# Patient Record
Sex: FEMALE | Race: WHITE | NOT HISPANIC OR LATINO | Employment: PART TIME | URBAN - METROPOLITAN AREA
[De-identification: names, ages, dates, MRNs, and addresses within clinical notes are randomized per-mention and may not be internally consistent; named-entity substitution may affect disease eponyms.]

---

## 2018-02-12 ENCOUNTER — OFFICE VISIT (OUTPATIENT)
Dept: URGENT CARE | Facility: CLINIC | Age: 48
End: 2018-02-12
Payer: COMMERCIAL

## 2018-02-12 ENCOUNTER — APPOINTMENT (OUTPATIENT)
Dept: RADIOLOGY | Facility: CLINIC | Age: 48
End: 2018-02-12
Payer: COMMERCIAL

## 2018-02-12 VITALS
HEIGHT: 64 IN | SYSTOLIC BLOOD PRESSURE: 114 MMHG | RESPIRATION RATE: 20 BRPM | OXYGEN SATURATION: 99 % | BODY MASS INDEX: 24.34 KG/M2 | WEIGHT: 142.6 LBS | DIASTOLIC BLOOD PRESSURE: 103 MMHG | TEMPERATURE: 98.8 F | HEART RATE: 114 BPM

## 2018-02-12 DIAGNOSIS — J06.9 UPPER RESPIRATORY TRACT INFECTION, UNSPECIFIED TYPE: ICD-10-CM

## 2018-02-12 DIAGNOSIS — R05.9 COUGH: ICD-10-CM

## 2018-02-12 DIAGNOSIS — J06.9 UPPER RESPIRATORY TRACT INFECTION, UNSPECIFIED TYPE: Primary | ICD-10-CM

## 2018-02-12 PROCEDURE — 71046 X-RAY EXAM CHEST 2 VIEWS: CPT

## 2018-02-12 PROCEDURE — 99203 OFFICE O/P NEW LOW 30 MIN: CPT | Performed by: PHYSICIAN ASSISTANT

## 2018-02-12 RX ORDER — DOXYCYCLINE 100 MG/1
100 TABLET ORAL 2 TIMES DAILY
Qty: 20 TABLET | Refills: 0 | Status: SHIPPED | OUTPATIENT
Start: 2018-02-12 | End: 2018-02-22

## 2018-02-12 RX ORDER — BUPROPION HYDROCHLORIDE 150 MG/1
150 TABLET ORAL DAILY
COMMUNITY

## 2018-02-12 NOTE — PROGRESS NOTES
Assessment/Plan:      Diagnoses and all orders for this visit:    Upper respiratory tract infection, unspecified type  -     X-ray chest 2 views; Future  -     doxycycline (ADOXA) 100 MG tablet; Take 1 tablet (100 mg total) by mouth 2 (two) times a day for 10 days    Cough    Other orders  -     buPROPion (WELLBUTRIN XL) 150 mg 24 hr tablet; Take 150 mg by mouth daily        Patient Instructions   Take antibiotic as prescribed  otc meds as needed for sxs control  Follow up with family dr in 4 days      Subjective:    Chief Complaint   Patient presents with    Cough      Patient ID: Maddy Shepherd is a 41 y o  female  Cough   This is a new problem  The current episode started yesterday  The problem has been unchanged  The problem occurs every few minutes  The cough is non-productive  Associated symptoms include ear pain, myalgias, nasal congestion, postnasal drip, rhinorrhea and a sore throat  Pertinent negatives include no chest pain, chills, ear congestion, fever, headaches, heartburn, hemoptysis, rash, shortness of breath, sweats, weight loss or wheezing  Nothing aggravates the symptoms  She has tried OTC cough suppressant for the symptoms  The treatment provided mild relief  There is no history of asthma, bronchitis, COPD or emphysema  Review of Systems   Constitutional: Negative for chills, fever and weight loss  HENT: Positive for ear pain, postnasal drip, rhinorrhea and sore throat  Respiratory: Positive for cough  Negative for hemoptysis, shortness of breath and wheezing  Cardiovascular: Negative for chest pain  Gastrointestinal: Negative for heartburn  Musculoskeletal: Positive for myalgias  Skin: Negative for rash  Neurological: Negative for headaches           Objective:    BP (!) 114/103 (BP Location: Right arm, Patient Position: Sitting, Cuff Size: Large)   Pulse (!) 114   Temp 98 8 °F (37 1 °C) (Tympanic)   Resp 20   Ht 5' 4" (1 626 m)   Wt 64 7 kg (142 lb 9 6 oz)   SpO2 99%   BMI 24 48 kg/m²      Physical Exam   Constitutional: She appears well-developed and well-nourished  HENT:   Head: Normocephalic and atraumatic  Right Ear: External ear normal    Left Ear: External ear normal    Nose: Mucosal edema and rhinorrhea (clear) present  Mouth/Throat: Posterior oropharyngeal erythema present  Cardiovascular: Regular rhythm, normal heart sounds and normal pulses  No extrasystoles are present  Tachycardia present  PMI is not displaced  Pulmonary/Chest: Effort normal and breath sounds normal    Lymphadenopathy:     She has cervical adenopathy

## 2018-12-03 ENCOUNTER — OFFICE VISIT (OUTPATIENT)
Dept: URGENT CARE | Facility: CLINIC | Age: 48
End: 2018-12-03
Payer: COMMERCIAL

## 2018-12-03 VITALS
RESPIRATION RATE: 18 BRPM | WEIGHT: 143 LBS | DIASTOLIC BLOOD PRESSURE: 91 MMHG | OXYGEN SATURATION: 99 % | HEART RATE: 118 BPM | BODY MASS INDEX: 24.55 KG/M2 | TEMPERATURE: 98.1 F | SYSTOLIC BLOOD PRESSURE: 130 MMHG

## 2018-12-03 DIAGNOSIS — J02.0 STREP PHARYNGITIS: Primary | ICD-10-CM

## 2018-12-03 LAB — S PYO AG THROAT QL: POSITIVE

## 2018-12-03 PROCEDURE — 99213 OFFICE O/P EST LOW 20 MIN: CPT | Performed by: PHYSICIAN ASSISTANT

## 2018-12-03 PROCEDURE — 87430 STREP A AG IA: CPT | Performed by: PHYSICIAN ASSISTANT

## 2018-12-03 RX ORDER — MULTIVITAMIN
1 CAPSULE ORAL DAILY
COMMUNITY

## 2018-12-03 RX ORDER — AMOXICILLIN 500 MG/1
1000 CAPSULE ORAL EVERY 24 HOURS
Qty: 20 CAPSULE | Refills: 0 | Status: SHIPPED | OUTPATIENT
Start: 2018-12-03 | End: 2018-12-13

## 2018-12-03 NOTE — PROGRESS NOTES
[facility]        NAME: Javier Colindres is a 57 y o  female  : 1970    MRN: [id number]  DATE: December 3, 2018  TIME: 11:19 AM    Assessment and Plan   Strep pharyngitis [J02 0]  1  Strep pharyngitis  POCT rapid strepA    amoxicillin (AMOXIL) 500 mg capsule         Patient Instructions     Take antibiotic as directed until completed  Tylenol as needed for fevers aches and pains  Stay well hydrated  Follow up with PCP in 3-5 days  Proceed to  ER if symptoms worsen  Chief Complaint     Chief Complaint   Patient presents with    Earache     Bilateral earaches, onset 3 days ago  Sore throat over the weekend that has subsided         History of Present Illness       17-year-old female presents with sore throat and ear pain  Symptoms started 3 days ago  Has been feeling fevers and chills  Stomach has felt a little upset nauseous  Denies any vomiting diarrhea chest pain  Sore Throat    This is a new problem  The current episode started in the past 7 days  The problem has been waxing and waning  Neither side of throat is experiencing more pain than the other  Maximum temperature: Subjective  The fever has been present for 1 to 2 days  The pain is moderate  Associated symptoms include ear pain  Pertinent negatives include no abdominal pain, congestion, coughing, ear discharge, headaches, shortness of breath, swollen glands or trouble swallowing  She has tried acetaminophen for the symptoms  The treatment provided mild relief  Review of Systems   Review of Systems   Constitutional: Positive for chills and fever  HENT: Positive for ear pain and sore throat  Negative for congestion, ear discharge and trouble swallowing  Respiratory: Negative for cough and shortness of breath  Gastrointestinal: Negative for abdominal pain  Neurological: Negative for headaches           Current Medications       Current Outpatient Prescriptions:     buPROPion (WELLBUTRIN XL) 150 mg 24 hr tablet, Take 150 mg by mouth daily, Disp: , Rfl:     Multiple Vitamin (MULTIVITAMIN) capsule, Take 1 capsule by mouth daily, Disp: , Rfl:     Probiotic Product (PROBIOTIC-10 PO), Take by mouth, Disp: , Rfl:     amoxicillin (AMOXIL) 500 mg capsule, Take 2 capsules (1,000 mg total) by mouth every 24 hours for 10 days, Disp: 20 capsule, Rfl: 0    Current Allergies     Allergies as of 12/03/2018 - Reviewed 12/03/2018   Allergen Reaction Noted    Advil [ibuprofen] Diarrhea 02/12/2018    Azithromycin Diarrhea and Rash 02/12/2018            The following portions of the patient's history were reviewed and updated as appropriate: allergies, current medications, past family history, past medical history, past social history, past surgical history and problem list      History reviewed  No pertinent past medical history  Past Surgical History:   Procedure Laterality Date    ENDOMETRIAL ABLATION         No family history on file  Medications have been verified  Objective   /91   Pulse (!) 118   Temp 98 1 °F (36 7 °C) (Tympanic)   Resp 18   Wt 64 9 kg (143 lb)   SpO2 99%   BMI 24 55 kg/m²        Physical Exam     Physical Exam   Constitutional: She is oriented to person, place, and time  She appears well-developed and well-nourished  No distress  HENT:   Head: Normocephalic and atraumatic  Right Ear: External ear normal    Left Ear: External ear normal    Nose: Nose normal    Mouth/Throat: Posterior oropharyngeal erythema (Moderate erythema noted) present  No oropharyngeal exudate  Eyes: Conjunctivae are normal  Right eye exhibits no discharge  Left eye exhibits no discharge  Neck: Normal range of motion  Neck supple  Cardiovascular: Normal rate, regular rhythm, normal heart sounds and intact distal pulses  No murmur heard  Pulmonary/Chest: Effort normal and breath sounds normal  No respiratory distress  She has no wheezes  She has no rales  Abdominal: Soft   Bowel sounds are normal  There is no tenderness  Musculoskeletal: Normal range of motion  Lymphadenopathy:     She has no cervical adenopathy  Neurological: She is alert and oriented to person, place, and time  Skin: Skin is warm and dry  Psychiatric: She has a normal mood and affect  Nursing note and vitals reviewed

## 2018-12-03 NOTE — LETTER
December 3, 2018     Patient: Nazario Hassan   YOB: 1970   Date of Visit: 12/3/2018       To Whom it May Concern:    Nazario Hassan was seen in my clinic on 12/3/2018  She may return to work on 12/05/2018  If you have any questions or concerns, please don't hesitate to call  Sincerely,          Jodi Sim PA-C        CC: No Recipients

## 2018-12-03 NOTE — PATIENT INSTRUCTIONS
Take antibiotic as directed until completed  Tylenol as needed for fevers aches and pains  Stay well hydrated  Follow up with PCP in 3-5 days  Proceed to  ER if symptoms worsen  Strep Throat   AMBULATORY CARE:   Strep throat  is a throat infection caused by bacteria  It is easily spread from person to person  Common symptoms include the following:   · Sore, red, and swollen throat    · Fever and headache     · Upset stomach, abdominal pain, or vomiting    · White or yellow patches or blisters in the back of your throat    · Tender, swollen lumps on the sides of your neck or jaw    · Throat pain when you swallow  Call 911 for any of the following:   · You have trouble breathing  Seek care immediately if:   · You have new symptoms like a bad headache, stiff neck, chest pain, or vomiting  · You are drooling because you cannot swallow your spit  Contact your healthcare provider if:   · You have a fever  · You have a rash or ear pain  · You have green, yellow-brown, or bloody mucus when you cough or blow your nose  · You are unable to drink anything  · You have questions or concerns about your condition or care  Treatment for strep throat  may include antibiotic medicine to treat your strep throat  You should feel better within 2 to 3 days after you start antibiotics  You may return to work or school 24 hours after you start antibiotics  Manage strep throat:   · Use lozenges, ice, soft foods, or popsicles  to soothe your throat  · Drink juice, milk shakes, or soup  if your throat is too sore to eat solid food  Drinking liquids can also help prevent dehydration  · Gargle with salt water  Mix ¼ teaspoon salt in a glass of warm water and gargle  This may help reduce swelling in your throat  · Do not smoke  Nicotine and other chemicals in cigarettes and cigars can cause lung damage and make your symptoms worse   Ask your healthcare provider for information if you currently smoke and need help to quit  E-cigarettes or smokeless tobacco still contain nicotine  Talk to your healthcare provider before you use these products  Prevent the spread of strep throat:   · Wash your hands often  Use soap and water  Wash your hands after you use the bathroom, change a child's diapers, or sneeze  Wash your hands before you prepare or eat food  · Do not share food or drinks  Replace your toothbrush after you have taken antibiotics for 24 hours  Follow up with your healthcare provider as directed:  Write down your questions so you remember to ask them during your visits  © 2017 [employer] Information is for End User's use only and may not be sold, redistributed or otherwise used for commercial purposes  All illustrations and images included in CareNotes® are the copyrighted property of [employer]  or Matthew Villaseñor  The above information is an  only  It is not intended as medical advice for individual conditions or treatments  Talk to your doctor, nurse or pharmacist before following any medical regimen to see if it is safe and effective for you

## 2021-06-17 DIAGNOSIS — Z12.31 ENCOUNTER FOR SCREENING MAMMOGRAM FOR MALIGNANT NEOPLASM OF BREAST: ICD-10-CM

## 2021-06-17 DIAGNOSIS — M75.02 ADHESIVE CAPSULITIS OF LEFT SHOULDER: ICD-10-CM

## 2021-06-17 DIAGNOSIS — M25.512 PAIN IN LEFT SHOULDER: ICD-10-CM

## 2021-06-17 DIAGNOSIS — G89.29 OTHER CHRONIC PAIN: ICD-10-CM

## 2021-06-24 ENCOUNTER — HOSPITAL ENCOUNTER (OUTPATIENT)
Dept: MRI IMAGING | Facility: HOSPITAL | Age: 51
Discharge: HOME | End: 2021-06-24
Payer: COMMERCIAL

## 2021-06-24 DIAGNOSIS — G89.29 OTHER CHRONIC PAIN: ICD-10-CM

## 2021-06-24 DIAGNOSIS — M75.02 ADHESIVE CAPSULITIS OF LEFT SHOULDER: ICD-10-CM

## 2021-06-24 DIAGNOSIS — M25.512 PAIN IN LEFT SHOULDER: ICD-10-CM

## 2021-06-24 PROCEDURE — 73221 MRI JOINT UPR EXTREM W/O DYE: CPT

## 2021-07-01 ENCOUNTER — OFFICE VISIT (OUTPATIENT)
Dept: OBGYN CLINIC | Facility: CLINIC | Age: 51
End: 2021-07-01
Payer: COMMERCIAL

## 2021-07-01 VITALS
SYSTOLIC BLOOD PRESSURE: 110 MMHG | BODY MASS INDEX: 23.43 KG/M2 | WEIGHT: 140.6 LBS | TEMPERATURE: 97.9 F | HEART RATE: 78 BPM | DIASTOLIC BLOOD PRESSURE: 76 MMHG | HEIGHT: 65 IN

## 2021-07-01 DIAGNOSIS — G89.29 CHRONIC LEFT SHOULDER PAIN: ICD-10-CM

## 2021-07-01 DIAGNOSIS — M75.02 ADHESIVE CAPSULITIS OF LEFT SHOULDER: Primary | ICD-10-CM

## 2021-07-01 DIAGNOSIS — M25.512 CHRONIC LEFT SHOULDER PAIN: ICD-10-CM

## 2021-07-01 DIAGNOSIS — M75.92 SUPRASPINATUS TENDINITIS, LEFT: ICD-10-CM

## 2021-07-01 PROCEDURE — 99243 OFF/OP CNSLTJ NEW/EST LOW 30: CPT | Performed by: STUDENT IN AN ORGANIZED HEALTH CARE EDUCATION/TRAINING PROGRAM

## 2021-07-01 RX ORDER — UBIDECARENONE 100 MG
CAPSULE ORAL
COMMUNITY

## 2021-07-01 NOTE — PROGRESS NOTES
1  Adhesive capsulitis of left shoulder     2  Supraspinatus tendinitis, left     3  Chronic left shoulder pain       No orders of the defined types were placed in this encounter  Imaging Studies (I personally reviewed images in PACS and report):    Prior imagin  MRI left shoulder 2021: Supraspinatus insertional tendinosis without a rotator cuff tear  IMPRESSION:  Chronic left shoulder pain without a precipitating injury - ongoing since   Adhesive capsulitis of the left shoulder - likely in phase III of recovery (thawing/regressive phase)  Left shoulder supraspinatus insertional tendinosis    Other factors: patient reports history of intolerance to oral NSAIDs and can only tolerate topical NSAIDs, aceatminophen  Risk factors: female, age    Consultation evaluation     PLAN:  I have discussed with the patient the pathophysiology of this diagnosis and reviewed how the examination correlates with this diagnosis  I counseled that recovery from adhesive capsulitis can range from 18-30 months and that her clinical exam/story seemed to indicate she is in the phase 3 of recovery in which she now displays almost complete range of motion of her left shoulder  Discussed the in regards to pain control I can provide a left shoulder cortisone injection, however patient feels that her pain is mild / tolerable at this time improves with Tylenol, icing, topical NSAIDs which is reasonable  I counseled her to continue formal physical therapy until her therapist comfortable transitioning to home exercise program   I counseled preventative measures include avoidance of prolonged shoulder immobilization  Patient prefers to follow-up as needed at this time if she were to require a cortisone injection, which is reasonable at this time  Return if symptoms worsen or fail to improve        CHIEF COMPLAINT:  Left shoulder pain    HPI:  Allison Alvarado is a right hand dominant 52 y o  female  who presents in regards to referral from her PCP, Dr Daxa Charlton, for       Visit 07/01/2021 :  Initial evaluation left shoulder pain: Started in beginning of 2020 w/o a precipitating injury -  Start with mild pain but progressively worsened over time  In the winter of 2020, her pain mildly improved but then she developed a "stuck" sensation of her left shoulder with limited range of motion  She had attempted to try oral NSAIDs but could not tolerate it and was transition to a topical NSAIDs which did improve her pain  She also states being on a short course oral steroids which did not provide much relief  Most recently, she states she started formal physical therapy last month which has helped reduce the intensity of pain and improved her range of motion  She recently had an MRI obtained by her PCP as noted above  She describes her pain as being located on the lateral aspect of her shoulder and can not intermittently radiate down her left arm  Pain is mild intensity, intermittent, and worsens with increased activity but has progressively been improving  She states she recently went kayaking and felt that it did aggravate her left shoulder pain, but not to this severity that it did before  She reports previously having difficulty sleeping due to this pain, but this has improved as well  She is able to accomplish activities of daily living currently  She reports intermittent clicking sensation of her left shoulder during range of motion  She denies any bruising, swelling, numbness of her left upper extremity  She reports never having similar issues to this in the past   She denies a history of diabetes, thyroid disease, prolonged immobilization of her shoulder  She has never had an injection of cortisone in her left shoulder for this issue  PCP visit summary 05/07/2021:   Patient complaining of left shoulder pain for approximately 1 year  Denies precipitating injury        Review of Systems Constitutional: Negative for chills, fatigue and fever  Respiratory: Negative for cough and shortness of breath  Gastrointestinal: Negative for abdominal pain, nausea and vomiting  Musculoskeletal:        As per HPI   Skin: Negative for rash and wound  Neurological:        As per HPI         Medical, Surgical, Family, and Social History    Past Medical History:   Diagnosis Date    Anxiety     IBS (irritable bowel syndrome)     Scoliosis      Past Surgical History:   Procedure Laterality Date    ENDOMETRIAL ABLATION       Social History   Social History     Substance and Sexual Activity   Alcohol Use Never     Social History     Substance and Sexual Activity   Drug Use Never     Social History     Tobacco Use   Smoking Status Never Smoker   Smokeless Tobacco Never Used     Family History   Problem Relation Age of Onset    Hypertension Mother     Hypertension Father     Asthma Father      Allergies   Allergen Reactions    Advil [Ibuprofen] Diarrhea    Azithromycin Diarrhea and Rash    Erythromycin Rash          Physical Exam  /76   Pulse 78   Temp 97 9 °F (36 6 °C) (Temporal)   Ht 5' 5" (1 651 m)   Wt 63 8 kg (140 lb 9 6 oz)   BMI 23 40 kg/m²     Constitutional:  see vital signs  Gen: well-developed, normocephalic/atraumatic, well-groomed  Eyes: No inflammation or discharge of conjunctiva or lids; sclera clear   Pharynx: no inflammation, lesion, or mass of lips  Neck: supple, no masses, non-distended  MSK: no inflammation, lesion, mass, or clubbing of nails and digits except for other than mentioned below  SKIN: no visible rashes or skin lesions  Pulmonary/Chest: Effort normal  No respiratory distress     NEURO: cranial nerves grossly intact  PSYCH:  Alert and oriented to person, place, and time; recent and remote memory intact; mood normal, no depression, anxiety, or agitation, judgment and insight good and intact     Ortho Exam  Shoulder exam:       LEFT    Inspection Erythema none Swelling none     Increased Warmth none    Rotator cuff ER 5/5     IR 5/5     Abduction 5/5    ROM      Abduction 160     ER0 60     ER90 90 Aggravates shoulder pain    IR90 30 (50 on right) Aggravates shoulder pain    IRb T10 (T7 on right)    TTP:      Special Tests: O'Briens  (FF 90, add 10, resist thumbs up-, resist thumbs down+) Negative slap    Crank  (Abd 90, axial load, rotate) Negative slap    Cross body Adduction Negative     Speeds  Negative     Yergason's Negative     Loan's Negative     Whipple (pt sit, FF 90 and addt to other shlder, press arm down Negative Partial thick SS tear      Neer Negative     Anderson Positive        UE NV Exam: +2 Radial pulses   Sensation intact to light touch C5-T1 bilaterally  OK sign: intact  Froment sign: intact  Thumb extension: 5/5    Left elbow, wrist, and and forearm ROM full, painless with passive ROM, no ttp or crepitance throughout extremities below shoulder joint

## 2021-07-01 NOTE — LETTER
2021     Arnoldo Friedman DO  [address]    Patient: Carlos Manuel Brown   YOB: 1970   Date of Visit: 2021       Dear Dr Renny Bernal:    Thank you for referring Karl Roth to me for evaluation  Below are my notes for this consultation  If you have questions, please do not hesitate to call me  I look forward to following your patient along with you  Sincerely,        Clarence Yan MD        CC: No Recipients  Clarence Yan MD  2021 10:45 AM  Signed  1  Adhesive capsulitis of left shoulder     2  Supraspinatus tendinitis, left     3  Chronic left shoulder pain       No orders of the defined types were placed in this encounter  Imaging Studies (I personally reviewed images in PACS and report):    Prior imagin  MRI left shoulder 2021: Supraspinatus insertional tendinosis without a rotator cuff tear  IMPRESSION:  Chronic left shoulder pain without a precipitating injury - ongoing since   Adhesive capsulitis of the left shoulder - likely in phase III of recovery (thawing/regressive phase)  Left shoulder supraspinatus insertional tendinosis    Other factors: patient reports history of intolerance to oral NSAIDs and can only tolerate topical NSAIDs, aceatminophen  Risk factors: female, age    Consultation evaluation     PLAN:  I have discussed with the patient the pathophysiology of this diagnosis and reviewed how the examination correlates with this diagnosis  I counseled that recovery from adhesive capsulitis can range from 18-30 months and that her clinical exam/story seemed to indicate she is in the phase 3 of recovery in which she now displays almost complete range of motion of her left shoulder    Discussed the in regards to pain control I can provide a left shoulder cortisone injection, however patient feels that her pain is mild / tolerable at this time improves with Tylenol, icing, topical NSAIDs which is reasonable  I counseled her to continue formal physical therapy until her therapist comfortable transitioning to home exercise program   I counseled preventative measures include avoidance of prolonged shoulder immobilization  Patient prefers to follow-up as needed at this time if she were to require a cortisone injection, which is reasonable at this time  Return if symptoms worsen or fail to improve  CHIEF COMPLAINT:  Left shoulder pain    HPI:  Zack Jesus is a right hand dominant 52 y o  female  who presents in regards to referral from her PCP, Dr Nathan Estevez, for       Visit 07/01/2021 :  Initial evaluation left shoulder pain: Started in beginning of 2020 w/o a precipitating injury -  Start with mild pain but progressively worsened over time  In the winter of 2020, her pain mildly improved but then she developed a "stuck" sensation of her left shoulder with limited range of motion  She had attempted to try oral NSAIDs but could not tolerate it and was transition to a topical NSAIDs which did improve her pain  She also states being on a short course oral steroids which did not provide much relief  Most recently, she states she started formal physical therapy last month which has helped reduce the intensity of pain and improved her range of motion  She recently had an MRI obtained by her PCP as noted above  She describes her pain as being located on the lateral aspect of her shoulder and can not intermittently radiate down her left arm  Pain is mild intensity, intermittent, and worsens with increased activity but has progressively been improving  She states she recently went kayaking and felt that it did aggravate her left shoulder pain, but not to this severity that it did before  She reports previously having difficulty sleeping due to this pain, but this has improved as well  She is able to accomplish activities of daily living currently    She reports intermittent clicking sensation of her left shoulder during range of motion  She denies any bruising, swelling, numbness of her left upper extremity  She reports never having similar issues to this in the past   She denies a history of diabetes, thyroid disease, prolonged immobilization of her shoulder  She has never had an injection of cortisone in her left shoulder for this issue  PCP visit summary 05/07/2021:   Patient complaining of left shoulder pain for approximately 1 year  Denies precipitating injury  Review of Systems   Constitutional: Negative for chills, fatigue and fever  Respiratory: Negative for cough and shortness of breath  Gastrointestinal: Negative for abdominal pain, nausea and vomiting  Musculoskeletal:        As per HPI   Skin: Negative for rash and wound     Neurological:        As per HPI         Medical, Surgical, Family, and Social History    Past Medical History:   Diagnosis Date    Anxiety     IBS (irritable bowel syndrome)     Scoliosis      Past Surgical History:   Procedure Laterality Date    ENDOMETRIAL ABLATION       Social History   Social History     Substance and Sexual Activity   Alcohol Use Never     Social History     Substance and Sexual Activity   Drug Use Never     Social History     Tobacco Use   Smoking Status Never Smoker   Smokeless Tobacco Never Used     Family History   Problem Relation Age of Onset    Hypertension Mother     Hypertension Father     Asthma Father      Allergies   Allergen Reactions    Advil [Ibuprofen] Diarrhea    Azithromycin Diarrhea and Rash    Erythromycin Rash          Physical Exam  /76   Pulse 78   Temp 97 9 °F (36 6 °C) (Temporal)   Ht 5' 5" (1 651 m)   Wt 63 8 kg (140 lb 9 6 oz)   BMI 23 40 kg/m²     Constitutional:  see vital signs  Gen: well-developed, normocephalic/atraumatic, well-groomed  Eyes: No inflammation or discharge of conjunctiva or lids; sclera clear   Pharynx: no inflammation, lesion, or mass of lips  Neck: supple, no masses, non-distended  MSK: no inflammation, lesion, mass, or clubbing of nails and digits except for other than mentioned below  SKIN: no visible rashes or skin lesions  Pulmonary/Chest: Effort normal  No respiratory distress     NEURO: cranial nerves grossly intact  PSYCH:  Alert and oriented to person, place, and time; recent and remote memory intact; mood normal, no depression, anxiety, or agitation, judgment and insight good and intact     Ortho Exam  Shoulder exam:       LEFT    Inspection Erythema none     Swelling none     Increased Warmth none    Rotator cuff ER 5/5     IR 5/5     Abduction 5/5    ROM      Abduction 160     ER0 60     ER90 90 Aggravates shoulder pain    IR90 30 (50 on right) Aggravates shoulder pain    IRb T10 (T7 on right)    TTP:      Special Tests: O'Briens  (FF 90, add 10, resist thumbs up-, resist thumbs down+) Negative slap    Crank  (Abd 90, axial load, rotate) Negative slap    Cross body Adduction Negative     Speeds  Negative     Yergason's Negative     Loan's Negative     Whipple (pt sit, FF 90 and addt to other shlder, press arm down Negative Partial thick SS tear      Neer Negative     Anderson Positive        UE NV Exam: +2 Radial pulses   Sensation intact to light touch C5-T1 bilaterally  OK sign: intact  Froment sign: intact  Thumb extension: 5/5    Left elbow, wrist, and and forearm ROM full, painless with passive ROM, no ttp or crepitance throughout extremities below shoulder joint

## 2021-07-01 NOTE — PATIENT INSTRUCTIONS
Adhesive Capsulitis   WHAT YOU NEED TO KNOW:   Adhesive capsulitis happens when tissues in your shoulder tighten and swell  The condition is often called frozen shoulder because the swollen tissues cause pain and decrease your shoulder movement  DISCHARGE INSTRUCTIONS:   Return to the emergency department if:   · You have new or increased trouble moving your arm  Contact your healthcare provider if:   · You have worse pain and stiffness in your shoulder  · You have questions or concerns about your condition  Medicines:   · Prescription pain medicine  may be given  Ask your healthcare provider how to take this medicine safely  Some prescription pain medicines contain acetaminophen  Do not take other medicines that contain acetaminophen without talking to your healthcare provider  Too much acetaminophen may cause liver damage  Prescription pain medicine may cause constipation  Ask your healthcare provider how to prevent or treat constipation  · NSAIDs  help decrease swelling and pain or fever  This medicine is available with or without a doctor's order  NSAIDs can cause stomach bleeding or kidney problems in certain people  If you take blood thinner medicine, always ask your healthcare provider if NSAIDs are safe for you  Always read the medicine label and follow directions  · Take your medicine as directed  Contact your healthcare provider if you think your medicine is not helping or if you have side effects  Tell him of her if you are allergic to any medicine  Keep a list of the medicines, vitamins, and herbs you take  Include the amounts, and when and why you take them  Bring the list or the pill bottles to follow-up visits  Carry your medicine list with you in case of an emergency  Physical therapy:  A physical therapist teaches you exercises to help improve movement and strength, and to decrease pain     Stretches to do at home:   · Doorway stretch:   a doorway with your painful arm bent at the elbow  Place your hand on the door frame and turn your body away from the door frame  Hold this position for 30 seconds  Relax and repeat  · Forward stretch:  Lie on your back with your legs straight out  Use your healthy arm to push your painful arm up over your head until you feel a gentle stretch  Hold this position for 15 seconds  Slowly lower your arm to the starting position  Relax and repeat  · Crossover stretch:  Use your healthy arm to gently pull your painful arm across your chest just below your chin  Pull until you feel a gentle stretch  Hold this position for 30 seconds  Relax and repeat  Apply ice as directed:  Ice helps decrease pain and swelling  Apply ice to help ease pain after stretching  Use an ice pack, or put crushed ice in a plastic bag  Cover it with a towel before you apply it to your shoulder  Apply ice for 15 to 20 minutes every hour, or as directed  Apply heat as directed:  Heat helps relax muscles and may help improve shoulder movement  Use a heat pack, or soak a small towel in warm water  Wring out the extra water before you apply the towel to your shoulder  Apply heat for 20 to 30 minutes every hour, or as directed  Follow up with your healthcare provider as directed:  Write down your questions so you remember to ask them during your visits  © Copyright [address] Information is for End User's use only and may not be sold, redistributed or otherwise used for commercial purposes  All illustrations and images included in CareNotes® are the copyrighted property of [employer]  or [facility] Boudreaux   The above information is an  only  It is not intended as medical advice for individual conditions or treatments  Talk to your doctor, nurse or pharmacist before following any medical regimen to see if it is safe and effective for you

## 2024-02-12 ENCOUNTER — TELEPHONE (OUTPATIENT)
Dept: PODIATRY | Facility: CLINIC | Age: 54
End: 2024-02-12

## 2024-02-12 ENCOUNTER — OFFICE VISIT (OUTPATIENT)
Dept: PODIATRY | Age: 54
End: 2024-02-12
Payer: COMMERCIAL

## 2024-02-12 ENCOUNTER — HOSPITAL ENCOUNTER (OUTPATIENT)
Dept: RADIOLOGY | Facility: CLINIC | Age: 54
Discharge: HOME | End: 2024-02-12
Payer: COMMERCIAL

## 2024-02-12 VITALS
SYSTOLIC BLOOD PRESSURE: 120 MMHG | DIASTOLIC BLOOD PRESSURE: 90 MMHG | OXYGEN SATURATION: 99 % | HEART RATE: 87 BPM | BODY MASS INDEX: 23.36 KG/M2 | HEIGHT: 65 IN | WEIGHT: 140.2 LBS | TEMPERATURE: 97.5 F

## 2024-02-12 DIAGNOSIS — M72.2 PLANTAR FASCIITIS: Primary | ICD-10-CM

## 2024-02-12 DIAGNOSIS — M72.2 PLANTAR FASCIITIS: ICD-10-CM

## 2024-02-12 PROCEDURE — 99203 OFFICE O/P NEW LOW 30 MIN: CPT | Performed by: STUDENT IN AN ORGANIZED HEALTH CARE EDUCATION/TRAINING PROGRAM

## 2024-02-12 PROCEDURE — 73630 X-RAY EXAM OF FOOT: CPT

## 2024-02-12 RX ORDER — MULTIVITAMIN WITH IRON
TABLET ORAL DAILY
COMMUNITY

## 2024-02-12 NOTE — TELEPHONE ENCOUNTER
Caller: Fay Guevara    Doctor: Ifrah Badillo DPM    Reason for call: Fay was seen in the office today and was given some stretches to do at home. She is not sure if Dr. Badillo wants her to continue with physical therapy.  Please clarify.    Call back#: [phone]

## 2024-02-12 NOTE — PATIENT INSTRUCTIONS
Foot Pain Home Therapy    Stretch. Place painful foot in back with heel on ground and lean against wall. Do not lift heel off of ground. You will feel the stretch in the calf muscles and possibly the heel. Hold the stretch for 20-30 seconds. Do this at least 5-6 times per day. It is best done after exercise when your muscles are warm.  Wear supportive shoes at all times. Avoid flip-flops, flat sandals, barefoot walking (never walk barefoot, even at home). Generally avoid shoes that are too flexible and bend in the arch. Your shoes should only slightly bend in the toe area, not the middle. Running sneakers are often the best choice.  Supportive sneaker brands: Marquez, On Cloud, Hoka, Altra, New Balance, Asics, Mizuno  [employer] (discount if mention Dr jack)  Fleet Feet [place]  [facility] [place]   [employer] [place]  Supportive daily shoe brands: Vionic, Orthofeet, Daisy, Dansko, Chacos, Delgado, Teva, Birkenstock  Supportive home shoes: Nila Garcia (recovery slides)  Purchase over the counter topical pain creams such as Voltarin gel, biofreeze, or CBD cream - will need to apply 2-3 times per day for benefit. + deep tissue massage.   Look in to over the counter shoe inserts/arch supports such as Dananberg arch supports. These are all available on Amazon.com as well as their individual website's. Remove sticky tabs.   Amazon.com: Vasyli+Dananberg 1st Ray Orthotic, Medium, 1st Ray Function, Medium Density, Full-Length Insole, Heat Molding Optional, Best All Around Orthotic, Functional Biomechanical Control for Pain Relief, Black Yellow (06596) : Health & Household        Achilles Tendon Stretching Exercises    A) Standing Gastrocnemius stretch  Place hands on wall or chair. If using wall, put your hands at eye level.  Step the leg you want to stretch behind you. Keep your back heel on the floor and point your toes straight ahead or slightly inward towards the heel of the opposite foot.    Bend your knee toward the wall while keeping your back leg straight.  Lean toward the wall until you feel a gentle stretch in you calf of the straight leg. Don't lean so far that you feel pain.  Hold for 15 seconds. Complete 3 reps.    B) Standing soleus stretch  Place your hands on the wall or chair. If using wall, put your hands at eye level.  Step the leg you want to stretch behind you (your back foot will need to be closer to the front foot than the above stretch). Keep your back heel on the floor and point your toes straight ahead or slightly inward towards the heel of the opposite foot.   Bend both your front and back knee at the same time (may help to stick your butt out). You do not need to lean towards the wall, just bend the knees.   Lean toward the wall until you feel a gentle stretch in you calf of the straight leg. Don't lean so far that you feel pain.  Hold for 15 seconds. Complete 3 reps.          Keep these tips and tricks in mind to get the most out of your stretching;  Take your time - move slowly, whether you are deepening into a stretch or changing positions. This will limit the risk of injury & discomfort.  Avoid bouncing - quick sudden movements will only worsen achilles tendon issues. Stay relaxed during stretch.  Keep your heel down and toes straight ahead or slightly inward - this will allow the achilles tendon to stretch properly.   Stop if you feel pain - Don't strain or force your muscle. If you feel sharp pain, stop immediately.

## 2024-02-12 NOTE — LETTER
February 12, 2024     Patient: Fay Guevara  YOB: 1970  Date of Visit: 2/12/2024      To Whom it May Concern:    Fay Guevara is under my professional care. Fay was seen in my office on 2/12/2024. Fay may return to work with limitations - must be allowed to wear sneakers .    If you have any questions or concerns, please don't hesitate to call.         Sincerely,          Ifrah Badillo DPM        CC: No Recipients

## 2024-02-12 NOTE — PROGRESS NOTES
Assessment/Plan:     Diagnoses and all orders for this visit:    Plantar fasciitis  -     X-ray foot left 3+ views; Future  -     X-ray foot right 3+ views; Future    Other orders  -     vitamin B-12 (CYANOCOBALAMIN) 50 MCG tablet; Take by mouth daily          Imaging Reviewed at this visit (I personally reviewed/independently interpreted images and reports in PACS)  XR right and left foot WB 6v 2/12/24: No acute osseous abnormalities noted. Slightly shortened 1st metatarsal. Small plantar calcaneal spurs.       IMPRESSION:  B/L heel pain, L>R. The differential diagnosis includes plantar fasciitis, Gomez's nerve entrapment, fat pad atrophy, venous insufficiency, degenerative changes (calcaneal spurs, arthrosis of the joints of the foot), and inflammatory conditions of the ligaments and fascia of the foot and ankle.      PLAN:  I reviewed clinical exam and radiographic imaging (XR) with patient in detail today. I have discussed with the patient the pathophysiology of this diagnosis and reviewed how the examination correlates with this diagnosis.  I reviewed PCP note from 10/2/23  I explained at length the biomechanical abnormalities leading to the significant overload of the plantar fascia. I stressed the importance of proper shoe gear and OTC arch supports to reposition foot to reduce tension on soft tissue structures and give cushion.  Instructions were given for conservative care which was also demonstrated during the clinical visit. I stressed the importance of achilles tendon stretching, icing and applying voltarin gel or biofreeze (OTC) at least 3x/day.    I recommend stiff bottomed sneakers/shoes (ex Asics, Vionic, New balance, Marquez, etc) for daily use and Nila Garcia (recovery slides) for in home use.   I advised pt to obtain OTC Dananberg arch supports  C/w PT at this visit to aide in reduction of equinus and also address the plantar fascia and gastroc aponeurosis with graston technique.   Pt defers  "meloxicam rx today  RTC 6 wwks (MRI vs injection vs meloxicam)    Subjective:      Patient ID: Fay Guevara is a 53 y.o. female.    Fay presents to clinic today concerning B/L heel pain worse in the morning. Present over the past year and worsening L>R. Uses massage roller and compression stockings with minimal benefit. Has been going to PT (had been stretching, just started graston) since October and notes some benefit. No further sharp pains. Asics. No arch supports. Uses compressive socks to arch.        The following portions of the patient's history were reviewed and updated as appropriate: allergies, current medications, past family history, past medical history, past social history, past surgical history, and problem list.    Review of Systems   Constitutional:  Negative for activity change, chills and fever.   HENT: Negative.     Respiratory:  Negative for cough, chest tightness and shortness of breath.    Cardiovascular:  Negative for chest pain and leg swelling.   Endocrine: Negative.    Genitourinary: Negative.    Musculoskeletal:  Positive for gait problem.   Neurological:  Negative for numbness.   Psychiatric/Behavioral: Negative.  Negative for agitation and behavioral problems.          Objective:      /90   Pulse 87   Temp 97.5 °F (36.4 °C) (Temporal)   Ht 5' 5\" (1.651 m)   Wt 63.6 kg (140 lb 3.2 oz)   SpO2 99%   BMI 23.33 kg/m²          Physical Exam  Constitutional:       Appearance: Normal appearance. She is not ill-appearing.   Cardiovascular:      Pulses: Normal pulses.      Comments: Bilateral DP & PT pulses 2/4. CRT WNL. Pedal hair present. Feet warm and well perfused.   Pulmonary:      Effort: Pulmonary effort is normal. No respiratory distress.   Musculoskeletal:         General: Tenderness (L>R foot pain at plantar calcaneus at medial tubercle at insertion of plantar fascia.) present. Normal range of motion.      Comments: There is decreased ankle DF with knee extended " and flexed indicating gastroc-soleal equinus.   B/L WB exam exhibits rectus arch, heel position neutral.  Bilateral ankle, STJ, TNJ, TMTJ, MTPJ ROM WNL and without pain. LE muscle strength WNL.   Skin:     General: Skin is warm.      Capillary Refill: Capillary refill takes less than 2 seconds.      Findings: No erythema or lesion.   Neurological:      General: No focal deficit present.      Mental Status: She is alert and oriented to person, place, and time.      Sensory: No sensory deficit.      Comments: Gross sensation intact. Denies N/T/B to B/L feet.    Psychiatric:         Mood and Affect: Mood normal.

## 2024-03-25 ENCOUNTER — OFFICE VISIT (OUTPATIENT)
Dept: PODIATRY | Age: 54
End: 2024-03-25
Payer: COMMERCIAL

## 2024-03-25 VITALS
OXYGEN SATURATION: 99 % | SYSTOLIC BLOOD PRESSURE: 122 MMHG | BODY MASS INDEX: 23.32 KG/M2 | HEART RATE: 80 BPM | DIASTOLIC BLOOD PRESSURE: 80 MMHG | HEIGHT: 65 IN | WEIGHT: 140 LBS

## 2024-03-25 DIAGNOSIS — M72.2 PLANTAR FASCIITIS: Primary | ICD-10-CM

## 2024-03-25 DIAGNOSIS — G89.29 CHRONIC HEEL PAIN, LEFT: ICD-10-CM

## 2024-03-25 DIAGNOSIS — M79.672 CHRONIC HEEL PAIN, LEFT: ICD-10-CM

## 2024-03-25 PROCEDURE — 99214 OFFICE O/P EST MOD 30 MIN: CPT | Performed by: STUDENT IN AN ORGANIZED HEALTH CARE EDUCATION/TRAINING PROGRAM

## 2024-03-25 RX ORDER — MELOXICAM 7.5 MG/1
7.5 TABLET ORAL DAILY
Qty: 10 TABLET | Refills: 0 | Status: SHIPPED | OUTPATIENT
Start: 2024-03-25

## 2024-03-25 NOTE — PROGRESS NOTES
Assessment/Plan:     Diagnoses and all orders for this visit:    Plantar fasciitis  -     MRI ankle/heel left wo contrast; Future  -     meloxicam (Mobic) 7.5 mg tablet; Take 1 tablet (7.5 mg total) by mouth daily    Chronic heel pain, left  -     MRI ankle/heel left wo contrast; Future  -     meloxicam (Mobic) 7.5 mg tablet; Take 1 tablet (7.5 mg total) by mouth daily    Other orders  -     Nutritional Supplements (Menopause Formula) TABS; Take by mouth            Prior Imaging   XR right and left foot WB 6v 2/12/24: No acute osseous abnormalities noted. Slightly shortened 1st metatarsal. Small plantar calcaneal spurs.       IMPRESSION:  B/L heel pain, L>R. The differential diagnosis includes plantar fasciitis, Gomez's nerve entrapment, fat pad atrophy, venous insufficiency, degenerative changes (calcaneal spurs, arthrosis of the joints of the foot), and inflammatory conditions of the ligaments and fascia of the foot and ankle.      PLAN:  Right foot pain resolved, left still present (but somewhat improved). MRI left foot ordered.   C/w stiff bottomed sneakers/shoes (ex Asics, Vionic, New balance, Marquez, etc) for daily use and SABRINAofNila moreau (recovery slides) for in home use.   I advised pt to obtain [facility] supports  I reviewed PT note form [facility]. C/w HEP  Meloxicam 7.5 mg  prescribed.  Patient educated regarding bleeding risk of taking this medication as well as not taking any other nonsteroidal anti-inflammatory medications while taking this medication; counseled thoroughly regarding potential risk of Cardiovascular injury, Kidney injury, Gastrointestinal ulceration/bleeding. Patient voiced understanding   RTC 3 wks (injection vs surgical discussion)    Subjective:      Patient ID: Fay Guevara is a 53 y.o. female.    Fay presents to clinic today concerning B/L heel pain worse in the morning, L>R. Right essentially resolved however left still present but improving. Has been going to PT  "(had been stretching, just started graston) since October and notes some benefit. Occasional burning to left heel.        The following portions of the patient's history were reviewed and updated as appropriate: allergies, current medications, past family history, past medical history, past social history, past surgical history, and problem list.    Review of Systems   Constitutional:  Negative for activity change, chills and fever.   HENT: Negative.     Respiratory:  Negative for cough, chest tightness and shortness of breath.    Cardiovascular:  Negative for chest pain and leg swelling.   Endocrine: Negative.    Genitourinary: Negative.    Musculoskeletal:  Positive for gait problem.   Neurological:  Negative for numbness.   Psychiatric/Behavioral: Negative.  Negative for agitation and behavioral problems.          Objective:      /80   Pulse 80   Ht 5' 5\" (1.651 m)   Wt 63.5 kg (140 lb)   SpO2 99%   BMI 23.30 kg/m²          Physical Exam  Constitutional:       Appearance: Normal appearance. She is not ill-appearing.   Cardiovascular:      Pulses: Normal pulses.      Comments: Bilateral DP & PT pulses 2/4. CRT WNL. Pedal hair present. Feet warm and well perfused.   Pulmonary:      Effort: Pulmonary effort is normal. No respiratory distress.   Musculoskeletal:         General: Tenderness (Left foot pain at plantar calcaneus at medial tubercle at insertion of plantar fascia.) present. Normal range of motion.      Comments: There is decreased ankle DF with knee extended and flexed indicating gastroc-soleal equinus.   B/L WB exam exhibits rectus arch, heel position neutral.  Bilateral ankle, STJ, TNJ, TMTJ, MTPJ ROM WNL and without pain. LE muscle strength WNL.   Skin:     General: Skin is warm.      Capillary Refill: Capillary refill takes less than 2 seconds.      Findings: No erythema or lesion.   Neurological:      General: No focal deficit present.      Mental Status: She is alert and oriented to " person, place, and time.      Sensory: No sensory deficit.      Comments: Gross sensation intact. Denies N/T/B to B/L feet.    Psychiatric:         Mood and Affect: Mood normal.

## 2024-03-27 ENCOUNTER — TELEPHONE (OUTPATIENT)
Dept: PODIATRY | Age: 54
End: 2024-03-27

## 2024-03-27 DIAGNOSIS — G89.29 CHRONIC HEEL PAIN, LEFT: Primary | ICD-10-CM

## 2024-03-27 DIAGNOSIS — M79.672 CHRONIC HEEL PAIN, LEFT: Primary | ICD-10-CM

## 2024-03-27 NOTE — TELEPHONE ENCOUNTER
Called patient left voicemail that her insurance denied the MRI due to not doing 4-6 weeks of conservative treatment. We would need to cancel her MRI at this time and message was sent to Dr Badillo regarding PT order.

## 2024-04-24 ENCOUNTER — TELEPHONE (OUTPATIENT)
Age: 54
End: 2024-04-24

## 2024-04-24 NOTE — TELEPHONE ENCOUNTER
Caller: Fay Guevara    Doctor and/or Office: Dr. Badillo/Daniela    #: [phone]    Escalation: Care/Had to complete 6 months of physical therapy before insurance would approve an MRI. Calling to see what the next step would be-can she just get MRI script put into Epic or does she need to be seen before? If she needs to be seen I have no appts. Available until June in office. Please call pt back and advise/schedule. Thanks

## 2024-05-01 ENCOUNTER — HOSPITAL ENCOUNTER (OUTPATIENT)
Dept: MRI IMAGING | Facility: HOSPITAL | Age: 54
Discharge: HOME | End: 2024-05-01
Attending: STUDENT IN AN ORGANIZED HEALTH CARE EDUCATION/TRAINING PROGRAM
Payer: COMMERCIAL

## 2024-05-01 DIAGNOSIS — G89.29 CHRONIC HEEL PAIN, LEFT: ICD-10-CM

## 2024-05-01 DIAGNOSIS — M79.672 CHRONIC HEEL PAIN, LEFT: ICD-10-CM

## 2024-05-01 DIAGNOSIS — M72.2 PLANTAR FASCIITIS: ICD-10-CM

## 2024-05-01 PROCEDURE — 73721 MRI JNT OF LWR EXTRE W/O DYE: CPT

## 2024-06-19 ENCOUNTER — OFFICE VISIT (OUTPATIENT)
Dept: PODIATRY | Age: 54
End: 2024-06-19
Payer: COMMERCIAL

## 2024-06-19 VITALS
BODY MASS INDEX: 23.32 KG/M2 | TEMPERATURE: 98.2 F | HEIGHT: 65 IN | WEIGHT: 140 LBS | DIASTOLIC BLOOD PRESSURE: 76 MMHG | HEART RATE: 92 BPM | OXYGEN SATURATION: 99 % | SYSTOLIC BLOOD PRESSURE: 112 MMHG

## 2024-06-19 DIAGNOSIS — G89.29 CHRONIC HEEL PAIN, LEFT: Primary | ICD-10-CM

## 2024-06-19 DIAGNOSIS — M72.2 PLANTAR FASCIITIS: ICD-10-CM

## 2024-06-19 DIAGNOSIS — M79.672 CHRONIC HEEL PAIN, LEFT: Primary | ICD-10-CM

## 2024-06-19 PROCEDURE — 99213 OFFICE O/P EST LOW 20 MIN: CPT | Performed by: STUDENT IN AN ORGANIZED HEALTH CARE EDUCATION/TRAINING PROGRAM

## 2024-06-19 NOTE — PROGRESS NOTES
Assessment/Plan:     Diagnoses and all orders for this visit:    Chronic heel pain, left  -     Orthotics B/L  -     Ambulatory referral to Physical Therapy; Future    Plantar fasciitis  -     Orthotics B/L  -     Ambulatory referral to Physical Therapy; Future              Imaging reviewed today  MRI left ankle/heel 5/1/24: mild acute plantar fasciitis     Prior Imaging   XR right and left foot WB 6v 2/12/24: No acute osseous abnormalities noted. Slightly shortened 1st metatarsal. Small plantar calcaneal spurs.       IMPRESSION:  B/L heel pain, L>R. The differential diagnosis includes plantar fasciitis, Gomez's nerve entrapment, fat pad atrophy, venous insufficiency, degenerative changes (calcaneal spurs, arthrosis of the joints of the foot), and inflammatory conditions of the ligaments and fascia of the foot and ankle.      PLAN:  Right foot pain resolved, left has improved significantly and now intermittent on and off. MRI left ankle/heel reviewed as above   C/w stiff bottomed sneakers/shoes (ex Asics, Vionic, New balance, Marquez, etc) for daily use and Nila Garcia (recovery slides) for in home use.   C/w Dananberg arch supports. I rx'd CMOs for patient (w/ first ray cutout) ([facility])  C/w daily stretching, HEP  Injection and surgery discussed as next steps  RTC prn (injection vs surgical discussion)    Subjective:      Patient ID: Fay Guevara is a 53 y.o. female.    Fay presents to clinic today concerning B/L heel pain. Right still resolved and feeling great. Left has improved since last exam however still present intermittently (not daily), more good than bad days. Has not been stretching daily but when she does, the heel feels great. Did get the dananberg arch supports which feel good. Finished PT and states this felt great.          The following portions of the patient's history were reviewed and updated as appropriate: allergies, current medications, past family history, past medical history,  "past social history, past surgical history, and problem list.    Review of Systems   Constitutional:  Negative for activity change, chills and fever.   HENT: Negative.     Respiratory:  Negative for cough, chest tightness and shortness of breath.    Cardiovascular:  Negative for chest pain and leg swelling.   Endocrine: Negative.    Genitourinary: Negative.    Musculoskeletal:  Negative for gait problem.   Neurological:  Negative for numbness.   Psychiatric/Behavioral: Negative.  Negative for agitation and behavioral problems.          Objective:      /76   Pulse 92   Temp 98.2 °F (36.8 °C) (Temporal)   Ht 5' 5\" (1.651 m)   Wt 63.5 kg (140 lb)   SpO2 99%   BMI 23.30 kg/m²          Physical Exam  Constitutional:       Appearance: Normal appearance. She is not ill-appearing.   Cardiovascular:      Pulses: Normal pulses.      Comments: Bilateral DP & PT pulses 2/4. CRT WNL. Pedal hair present. Feet warm and well perfused.   Pulmonary:      Effort: Pulmonary effort is normal. No respiratory distress.   Musculoskeletal:         General: Tenderness (Left foot pain at plantar calcaneus at medial tubercle at insertion of plantar fascia, scant today) present. Normal range of motion.      Comments: There is decreased ankle DF with knee extended and flexed indicating gastroc-soleal equinus.   B/L WB exam exhibits rectus arch, heel position neutral.  Bilateral ankle, STJ, TNJ, TMTJ, MTPJ ROM WNL and without pain. LE muscle strength WNL.   Skin:     General: Skin is warm.      Capillary Refill: Capillary refill takes less than 2 seconds.      Findings: No erythema or lesion.   Neurological:      General: No focal deficit present.      Mental Status: She is alert and oriented to person, place, and time.      Sensory: No sensory deficit.      Comments: Gross sensation intact. Denies N/T/B to B/L feet.    Psychiatric:         Mood and Affect: Mood normal.           "

## 2024-06-23 NOTE — PROGRESS NOTES
Orthotic Evaluation    Today's date: 24  Patient name: Fay Guevara  : 1970  MRN: [id number]  Referring provider: Ifrah Badillo DPM  Dx:   Encounter Diagnosis     ICD-10-CM    1. Chronic heel pain, left  M79.672 Ambulatory referral to Physical Therapy    G89.29       2. Plantar fasciitis  M72.2 Ambulatory referral to Physical Therapy                      Subjective:    Fay presents today for orthotic evaluation. she complains of pain, balance dysfunction, and ambulatory dysfunction with functional activities including ambulation, transfers, leisure, and work. The patient plans to use her orthotic for walking, standing, and work. The orthotic will be placed in a standard, size Women's 10.    Objective:    Age: 53 y.o.  Weight: 140#     Foot Posture Index Score    Right Foot  Talar dome - +2  Malleolar curve - 0   Calcaneal eversion - +1  Talonavicular bulge - +1  Medial longitudinal arch - +1  Too many toes - +1  Total Score R = 6    Left Foot  Talar dome - +2  Malleolar curve - 0   Calcaneal eversion - +1  Talonavicular bulge - +1  Medial longitudinal arch - +1  Too many toes - +1  Total Score L = 6    Reference Values  Normal =    0 to +5  Pronated =  +6 to +9 Highly Pronated =  10+  Supinated =   -1 to -4 Highly Supinated = -5 to -12    Objective    Assessment:    Patient requires custom foot orthosis of Ortho Arch with deep heel cup, lateral 3* wedge, medium MET bar, and 1st ray cut out to correct altered gait mechanics. Patient is not currently controlled by her current shoe wear. Patient was educated on the design of the custom orthotic and it's ability to offload her current pathology. Patient was also educated on potential shoe wear changes with device, break-in period, and skin checks to avoid potential skin break down.     Orthotic goals:  1) Patient will have custom foot orthoses fitted to her shoe.   2) Patient will be compliant with break-in period of custom foot orthoses as  prescribed by PT.   3) Patient will be compliant with custom foot orthoses use as prescribed by PT.     Plan:    Planned therapy interventions: orthotic fitting/training  Duration in visits: 2

## 2024-06-25 ENCOUNTER — EVALUATION (OUTPATIENT)
Dept: PHYSICAL THERAPY | Facility: CLINIC | Age: 54
End: 2024-06-25
Payer: COMMERCIAL

## 2024-06-25 DIAGNOSIS — G89.29 CHRONIC HEEL PAIN, LEFT: ICD-10-CM

## 2024-06-25 DIAGNOSIS — M79.672 CHRONIC HEEL PAIN, LEFT: ICD-10-CM

## 2024-06-25 DIAGNOSIS — M72.2 PLANTAR FASCIITIS: ICD-10-CM

## 2024-06-25 PROCEDURE — 97760 ORTHOTIC MGMT&TRAING 1ST ENC: CPT | Performed by: PHYSICAL THERAPIST

## 2024-07-09 NOTE — PROGRESS NOTES
Daily Note     Today's date: 7/10/2024  Patient name: Fay Guevara  : 1970  MRN: [id number]  Referring provider: Ifrah Badillo DPM  Dx:   Encounter Diagnosis     ICD-10-CM    1. Chronic heel pain, left  M79.672     G89.29       2. Plantar fasciitis  M72.2                      Subjective:  Patient verbalizes understanding of proper break in period.  Patient reports no issues with initial fitting of custom orthotics.        Objective: See treatment diary below      Assessment: Tolerated treatment well.  PT notes the patient was instructed in proper break in period with orthotics with handout provided.  PT notes the patient with no issues with initial fitting of orthotics.       Plan:  DC with custom orthotics.

## 2024-07-10 ENCOUNTER — OFFICE VISIT (OUTPATIENT)
Dept: PHYSICAL THERAPY | Facility: CLINIC | Age: 54
End: 2024-07-10
Payer: COMMERCIAL

## 2024-07-10 DIAGNOSIS — M79.672 CHRONIC HEEL PAIN, LEFT: Primary | ICD-10-CM

## 2024-07-10 DIAGNOSIS — G89.29 CHRONIC HEEL PAIN, LEFT: Primary | ICD-10-CM

## 2024-07-10 DIAGNOSIS — M72.2 PLANTAR FASCIITIS: ICD-10-CM

## 2024-07-10 PROCEDURE — 97763 ORTHC/PROSTC MGMT SBSQ ENC: CPT | Performed by: PHYSICAL THERAPIST
